# Patient Record
Sex: FEMALE | Race: BLACK OR AFRICAN AMERICAN | Employment: PART TIME | URBAN - METROPOLITAN AREA
[De-identification: names, ages, dates, MRNs, and addresses within clinical notes are randomized per-mention and may not be internally consistent; named-entity substitution may affect disease eponyms.]

---
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
## 2018-06-23 ENCOUNTER — HOSPITAL ENCOUNTER (EMERGENCY)
Age: 18
Discharge: HOME | End: 2018-06-23
Attending: EMERGENCY MEDICINE
Payer: SELF-PAY

## 2018-06-23 VITALS
SYSTOLIC BLOOD PRESSURE: 141 MMHG | WEIGHT: 216 LBS | RESPIRATION RATE: 16 BRPM | DIASTOLIC BLOOD PRESSURE: 76 MMHG | HEART RATE: 75 BPM | BODY MASS INDEX: 38.27 KG/M2 | HEIGHT: 63 IN | TEMPERATURE: 99 F | OXYGEN SATURATION: 100 %

## 2018-06-23 DIAGNOSIS — N94.6 DYSMENORRHEA: Primary | ICD-10-CM

## 2018-06-23 LAB
APPEARANCE UR: ABNORMAL
BACTERIA URNS QL MICRO: NEGATIVE /HPF
BILIRUB UR QL: NEGATIVE
COLOR UR: ABNORMAL
EPITH CASTS URNS QL MICRO: ABNORMAL /LPF
GLUCOSE UR STRIP.AUTO-MCNC: NEGATIVE MG/DL
HCG UR QL: NEGATIVE
HGB UR QL STRIP: ABNORMAL
KETONES UR QL STRIP.AUTO: NEGATIVE MG/DL
LEUKOCYTE ESTERASE UR QL STRIP.AUTO: ABNORMAL
MUCOUS THREADS URNS QL MICRO: ABNORMAL /LPF
NITRITE UR QL STRIP.AUTO: NEGATIVE
OTHER,OTHU: ABNORMAL
PH UR STRIP: 8 [PH] (ref 5–8)
PROT UR STRIP-MCNC: 30 MG/DL
RBC #/AREA URNS HPF: ABNORMAL /HPF (ref 0–5)
SP GR UR REFRACTOMETRY: 1.02 (ref 1–1.03)
UR CULT HOLD, URHOLD: NORMAL
UROBILINOGEN UR QL STRIP.AUTO: 0.2 EU/DL (ref 0.2–1)
WBC URNS QL MICRO: ABNORMAL /HPF (ref 0–4)

## 2018-06-23 PROCEDURE — 81025 URINE PREGNANCY TEST: CPT

## 2018-06-23 PROCEDURE — 74011250637 HC RX REV CODE- 250/637: Performed by: EMERGENCY MEDICINE

## 2018-06-23 PROCEDURE — 99283 EMERGENCY DEPT VISIT LOW MDM: CPT

## 2018-06-23 PROCEDURE — 81001 URINALYSIS AUTO W/SCOPE: CPT | Performed by: EMERGENCY MEDICINE

## 2018-06-23 RX ORDER — NAPROXEN 250 MG/1
375 TABLET ORAL ONCE
Status: COMPLETED | OUTPATIENT
Start: 2018-06-23 | End: 2018-06-23

## 2018-06-23 RX ORDER — NAPROXEN 375 MG/1
375 TABLET ORAL 2 TIMES DAILY WITH MEALS
Qty: 20 TAB | Refills: 0 | Status: SHIPPED | OUTPATIENT
Start: 2018-06-23

## 2018-06-23 RX ADMIN — NAPROXEN 375 MG: 250 TABLET ORAL at 00:54

## 2018-06-23 NOTE — ED TRIAGE NOTES
Triage: On her menstrual cycle on 6/20 bleeding is heavy per patient baseline per patient. Not currently taking birth control. Took Tylenol without relief.   Denies pregnancy

## 2018-06-23 NOTE — ED NOTES
Dr. Rebecca Vela with Willis Edmondson RN at bedside for pelvic exam     Patient was unable to tolerate pelvic exam either manually or with speculum, no specimens collected at this time

## 2018-06-23 NOTE — DISCHARGE INSTRUCTIONS
We hope that we have addressed all of your medical concerns. The examination and treatment you received in the Emergency Department were for an emergent problem and were not intended as complete care. It is important that you follow up with your healthcare provider(s) for ongoing care. If your symptoms worsen or do not improve as expected, and you are unable to reach your usual health care provider(s), you should return to the Emergency Department. Today's healthcare is undergoing tremendous change, and patient satisfaction surveys are one of the many tools to assess the quality of medical care. You may receive a survey from the [employer] organization regarding your experience in the Emergency Department. I hope that your experience has been completely positive, particularly the medical care that I provided. As such, please participate in the survey; anything less than excellent does not meet my expectations or intentions. Thank you for allowing us to provide you with medical care today. We realize that you have many choices for your emergency care needs. Please choose us in the future for any continued health care needs.       Riri Narvaez MD    [facility], [facility].   Office: [phone]            Recent Results (from the past 24 hour(s))   HCG URINE, QL. - POC    Collection Time: 06/23/18 12:40 AM   Result Value Ref Range    Pregnancy test,urine (POC) NEGATIVE  NEG     URINALYSIS W/MICROSCOPIC    Collection Time: 06/23/18 12:41 AM   Result Value Ref Range    Color YELLOW/STRAW      Appearance CLOUDY (A) CLEAR      Specific gravity 1.023 1.003 - 1.030      pH (UA) 8.0 5.0 - 8.0      Protein 30 (A) NEG mg/dL    Glucose NEGATIVE  NEG mg/dL    Ketone NEGATIVE  NEG mg/dL    Bilirubin NEGATIVE  NEG      Blood LARGE (A) NEG      Urobilinogen 0.2 0.2 - 1.0 EU/dL    Nitrites NEGATIVE  NEG      Leukocyte Esterase TRACE (A) NEG      WBC 5-10 0 - 4 /hpf    RBC 20-50 0 - 5 /hpf    Epithelial cells MODERATE (A) FEW /lpf    Bacteria NEGATIVE  NEG /hpf    Mucus 1+ (A) NEG /lpf    Other: Renal Epithelial cells Present     URINE CULTURE HOLD SAMPLE    Collection Time: 06/23/18 12:41 AM   Result Value Ref Range    Urine culture hold        URINE ON HOLD IN MICROBIOLOGY DEPT FOR 3 DAYS. IF UNPRESERVED URINE IS SUBMITTED, IT CANNOT BE USED FOR ADDITIONAL TESTING AFTER 24 HRS, RECOLLECTION WILL BE REQUIRED. No results found. Painful Menstrual Cramps in Teens: Care Instructions  Your Care Instructions    Painful menstrual cramps (called dysmenorrhea) are one of the most common reasons women seek medical attention. Painful periods can cause cramping in the back, thighs, and belly. You may also have diarrhea, constipation, or nausea. Some women get dizzy. Pain medicine and home treatment can help ease your cramps. Follow-up care is a key part of your treatment and safety. Be sure to make and go to all appointments, and call your doctor if you are having problems. It's also a good idea to know your test results and keep a list of the medicines you take. How can you care for yourself at home? · Take anti-inflammatory medicines to reduce pain, such as ibuprofen (Advil, Motrin) and naproxen (Aleve), for pain from cramps. ¨ Start taking the recommended dose of pain medicine as soon as you start to feel pain or the day before your period starts. Keep taking the medicine for as many days as your cramps last.  ¨ If anti-inflammatory medicines do not relieve the pain, try acetaminophen (Tylenol). ¨ Do not take two or more pain medicines at the same time unless the doctor told you to. Many pain medicines have acetaminophen, which is Tylenol. Too much acetaminophen (Tylenol) can be harmful. ¨ Talk to your doctor or pharmacist before you take any of these medicines. They may not be safe if you are taking other medicines or have other health problems.   ¨ Be safe with medicines. Read and follow all instructions on the label. · Put a warm water bottle, a heating pad set on low, or a warm cloth on your belly. Heat improves blood flow and may relieve pelvic pain. · Lie down and put a pillow under your knees, or lie on your side and bring your knees up to your chest. This will help relieve back pressure. · Use pads instead of tampons. · Get plenty of exercise every day. This improves blood flow and may decrease pain. Go for a walk or jog, ride your bike, or play sports with friends. When should you call for help? Call your doctor now or seek immediate medical care if:  ? · You have severe vaginal bleeding. ? · You have new or worse belly or pelvic pain. ? Watch closely for changes in your health, and be sure to contact your doctor if:  ? · You have unusual vaginal bleeding. ? · You do not get better as expected. Where can you learn more? Go to http://chuyita-cookie.info/. Enter I442 in the search box to learn more about \"Painful Menstrual Cramps in Teens: Care Instructions. \"  Current as of: October 13, 2016  Content Version: 11.4  © 6949-9922 [employer], [employer]. Care instructions adapted under license by [employer] (which disclaims liability or warranty for this information). If you have questions about a medical condition or this instruction, always ask your healthcare professional. [place] [address] any warranty or liability for your use of this information.

## 2018-06-23 NOTE — ED PROVIDER NOTES
HPI Comments: Patient is a 21year old female with a past medical history of Asthma, Anemia who presents to the ED complaining of lower abdominal pain today. Pt states that she began having lower abdominal/pelvic pain and it radiates to her back as well. She reports that she is currently on her menstrual cycle (started on 6/20) and it has been a normal period thus far. Pt currently rates her pain at 9/10 in severity. She additionally complaints of mild dysuria, but denies any fever, chills, n/v/d, constipation, chest pain or sob. Pt reports that she took Tylenol without relief. She denies being sexually active and denies taking birth control. Pt is not a smoker, denies EtOH/drug use. Signed by: collins Carrera for Tj Robb MD on June 23rd, 2018. The history is provided by the patient. No  was used. Past Medical History:   Diagnosis Date    Anemia NEC     Asthma        History reviewed. No pertinent surgical history. History reviewed. No pertinent family history. Social History     Social History    Marital status: SINGLE     Spouse name: N/A    Number of children: N/A    Years of education: N/A     Occupational History    Not on file. Social History Main Topics    Smoking status: Never Smoker    Smokeless tobacco: Never Used    Alcohol use No    Drug use: No    Sexual activity: No     Other Topics Concern    Not on file     Social History Narrative         ALLERGIES: Review of patient's allergies indicates no known allergies. Review of Systems   Constitutional: Negative for chills and fever. Respiratory: Negative for cough and shortness of breath. Gastrointestinal: Positive for abdominal pain. Negative for diarrhea, nausea and vomiting. Genitourinary: Positive for dysuria. Negative for flank pain. Musculoskeletal: Positive for back pain. Negative for neck pain.        Vitals:    06/23/18 0024   BP: 141/76   Pulse: 75   Resp: 16   Temp: 99 °F (37.2 °C)   SpO2: 100%   Weight: 98 kg (216 lb)   Height: 5' 3\" (1.6 m)            Physical Exam   Constitutional: She is oriented to person, place, and time. She appears well-developed and well-nourished. No distress. HENT:   Head: Normocephalic and atraumatic. Eyes: Conjunctivae are normal.   Neck: Normal range of motion. Cardiovascular: Normal rate, regular rhythm, normal heart sounds and intact distal pulses. Exam reveals no friction rub. No murmur heard. Pulmonary/Chest: Effort normal and breath sounds normal. No respiratory distress. She has no wheezes. She has no rales. She exhibits no tenderness. Abdominal: Soft. Bowel sounds are normal. She exhibits no distension. There is no tenderness. There is no rebound and no guarding. Genitourinary:   Genitourinary Comments: Pt unable to tolerate speculum exam; unable to tolerate bimanual   Musculoskeletal: Normal range of motion. Neurological: She is alert and oriented to person, place, and time. Coordination normal.   Skin: Skin is warm and dry. She is not diaphoretic. No pallor. Psychiatric: She has a normal mood and affect. Her behavior is normal.   Nursing note and vitals reviewed. MDM  Number of Diagnoses or Management Options  Diagnosis management comments: Likely menstrual cramps does not have the appearance of someone in distress with a torsion but will do a pelvic and assess. Could be ovarian cyst as well       Amount and/or Complexity of Data Reviewed  Clinical lab tests: ordered and reviewed    Patient Progress  Patient progress: stable        ED Course       Procedures    1:10 AM  Unable to tolerate pelvic exam and tearful. Will give naprosyn and reassess pain in an hour    2:15 AM  Pt reports feeling much better and pain is down to 5/10.  She does not want the pelvic ultrasound completed or pelvic exam; discussed while I think there is low likelihood this is  ovarian cyst but if pain worsens she could have a torsion and needs to return if pain worsens. Will refer to gyn.  Pt agrees